# Patient Record
Sex: FEMALE | Race: WHITE | URBAN - METROPOLITAN AREA
[De-identification: names, ages, dates, MRNs, and addresses within clinical notes are randomized per-mention and may not be internally consistent; named-entity substitution may affect disease eponyms.]

---

## 2017-01-04 ENCOUNTER — PATIENT MESSAGE (OUTPATIENT)
Dept: FAMILY MEDICINE CLINIC | Age: 67
End: 2017-01-04

## 2017-02-17 ENCOUNTER — OFFICE VISIT (OUTPATIENT)
Dept: FAMILY MEDICINE CLINIC | Age: 67
End: 2017-02-17

## 2017-02-17 VITALS
HEART RATE: 76 BPM | BODY MASS INDEX: 20.38 KG/M2 | HEIGHT: 63 IN | SYSTOLIC BLOOD PRESSURE: 120 MMHG | DIASTOLIC BLOOD PRESSURE: 60 MMHG | WEIGHT: 115 LBS

## 2017-02-17 DIAGNOSIS — Z00.00 GENERAL MEDICAL EXAM: ICD-10-CM

## 2017-02-17 DIAGNOSIS — E78.00 ELEVATED CHOLESTEROL: Primary | Chronic | ICD-10-CM

## 2017-02-17 DIAGNOSIS — Z23 NEED FOR VACCINATION WITH 13-POLYVALENT PNEUMOCOCCAL CONJUGATE VACCINE: ICD-10-CM

## 2017-02-17 DIAGNOSIS — Z11.59 NEED FOR HEPATITIS C SCREENING TEST: ICD-10-CM

## 2017-02-17 DIAGNOSIS — M85.80 OSTEOPENIA: Chronic | ICD-10-CM

## 2017-02-17 LAB
A/G RATIO: 2.1 (ref 1.1–2.2)
ALBUMIN SERPL-MCNC: 4.6 G/DL (ref 3.4–5)
ALP BLD-CCNC: 81 U/L (ref 40–129)
ALT SERPL-CCNC: 21 U/L (ref 10–40)
ANION GAP SERPL CALCULATED.3IONS-SCNC: 14 MMOL/L (ref 3–16)
AST SERPL-CCNC: 27 U/L (ref 15–37)
BASOPHILS ABSOLUTE: 0 K/UL (ref 0–0.2)
BASOPHILS RELATIVE PERCENT: 0.4 %
BILIRUB SERPL-MCNC: 0.4 MG/DL (ref 0–1)
BILIRUBIN, POC: NORMAL
BLOOD URINE, POC: NORMAL
BUN BLDV-MCNC: 19 MG/DL (ref 7–20)
CALCIUM SERPL-MCNC: 9.7 MG/DL (ref 8.3–10.6)
CHLORIDE BLD-SCNC: 103 MMOL/L (ref 99–110)
CHOLESTEROL, TOTAL: 252 MG/DL (ref 0–199)
CLARITY, POC: NORMAL
CO2: 24 MMOL/L (ref 21–32)
COLOR, POC: NORMAL
CREAT SERPL-MCNC: 0.6 MG/DL (ref 0.6–1.2)
EOSINOPHILS ABSOLUTE: 0 K/UL (ref 0–0.6)
EOSINOPHILS RELATIVE PERCENT: 0.6 %
GFR AFRICAN AMERICAN: >60
GFR NON-AFRICAN AMERICAN: >60
GLOBULIN: 2.2 G/DL
GLUCOSE BLD-MCNC: 87 MG/DL (ref 70–99)
GLUCOSE URINE, POC: NORMAL
HCT VFR BLD CALC: 43.8 % (ref 36–48)
HDLC SERPL-MCNC: 110 MG/DL (ref 40–60)
HEMOGLOBIN: 14.4 G/DL (ref 12–16)
HEPATITIS C ANTIBODY INTERPRETATION: NORMAL
KETONES, POC: NORMAL
LDL CHOLESTEROL CALCULATED: 132 MG/DL
LEUKOCYTE EST, POC: NORMAL
LYMPHOCYTES ABSOLUTE: 1.6 K/UL (ref 1–5.1)
LYMPHOCYTES RELATIVE PERCENT: 30.9 %
MCH RBC QN AUTO: 29.5 PG (ref 26–34)
MCHC RBC AUTO-ENTMCNC: 32.8 G/DL (ref 31–36)
MCV RBC AUTO: 89.8 FL (ref 80–100)
MONOCYTES ABSOLUTE: 0.5 K/UL (ref 0–1.3)
MONOCYTES RELATIVE PERCENT: 8.8 %
NEUTROPHILS ABSOLUTE: 3.1 K/UL (ref 1.7–7.7)
NEUTROPHILS RELATIVE PERCENT: 59.3 %
NITRITE, POC: NORMAL
PDW BLD-RTO: 13.9 % (ref 12.4–15.4)
PH, POC: 6
PLATELET # BLD: 195 K/UL (ref 135–450)
PMV BLD AUTO: 10.7 FL (ref 5–10.5)
POTASSIUM SERPL-SCNC: 3.9 MMOL/L (ref 3.5–5.1)
PROTEIN, POC: NORMAL
RBC # BLD: 4.88 M/UL (ref 4–5.2)
SODIUM BLD-SCNC: 141 MMOL/L (ref 136–145)
SPECIFIC GRAVITY, POC: NORMAL
TOTAL PROTEIN: 6.8 G/DL (ref 6.4–8.2)
TRIGL SERPL-MCNC: 48 MG/DL (ref 0–150)
TSH SERPL DL<=0.05 MIU/L-ACNC: 2.27 UIU/ML (ref 0.27–4.2)
UROBILINOGEN, POC: 0.2
VITAMIN D 25-HYDROXY: 23 NG/ML
VLDLC SERPL CALC-MCNC: 10 MG/DL
WBC # BLD: 5.3 K/UL (ref 4–11)

## 2017-02-17 PROCEDURE — G8399 PT W/DXA RESULTS DOCUMENT: HCPCS | Performed by: FAMILY MEDICINE

## 2017-02-17 PROCEDURE — 90670 PCV13 VACCINE IM: CPT | Performed by: FAMILY MEDICINE

## 2017-02-17 PROCEDURE — 81002 URINALYSIS NONAUTO W/O SCOPE: CPT | Performed by: FAMILY MEDICINE

## 2017-02-17 PROCEDURE — 1123F ACP DISCUSS/DSCN MKR DOCD: CPT | Performed by: FAMILY MEDICINE

## 2017-02-17 PROCEDURE — G8427 DOCREV CUR MEDS BY ELIG CLIN: HCPCS | Performed by: FAMILY MEDICINE

## 2017-02-17 PROCEDURE — 1090F PRES/ABSN URINE INCON ASSESS: CPT | Performed by: FAMILY MEDICINE

## 2017-02-17 PROCEDURE — 4040F PNEUMOC VAC/ADMIN/RCVD: CPT | Performed by: FAMILY MEDICINE

## 2017-02-17 PROCEDURE — 3017F COLORECTAL CA SCREEN DOC REV: CPT | Performed by: FAMILY MEDICINE

## 2017-02-17 PROCEDURE — 36415 COLL VENOUS BLD VENIPUNCTURE: CPT | Performed by: FAMILY MEDICINE

## 2017-02-17 PROCEDURE — G8484 FLU IMMUNIZE NO ADMIN: HCPCS | Performed by: FAMILY MEDICINE

## 2017-02-17 PROCEDURE — 99214 OFFICE O/P EST MOD 30 MIN: CPT | Performed by: FAMILY MEDICINE

## 2017-02-17 PROCEDURE — G8419 CALC BMI OUT NRM PARAM NOF/U: HCPCS | Performed by: FAMILY MEDICINE

## 2017-02-17 PROCEDURE — 3014F SCREEN MAMMO DOC REV: CPT | Performed by: FAMILY MEDICINE

## 2017-02-17 PROCEDURE — G0009 ADMIN PNEUMOCOCCAL VACCINE: HCPCS | Performed by: FAMILY MEDICINE

## 2017-02-17 PROCEDURE — 1036F TOBACCO NON-USER: CPT | Performed by: FAMILY MEDICINE

## 2017-03-17 ENCOUNTER — NURSE ONLY (OUTPATIENT)
Dept: FAMILY MEDICINE CLINIC | Age: 67
End: 2017-03-17

## 2017-03-17 DIAGNOSIS — Z23 NEED FOR TDAP VACCINATION: Primary | ICD-10-CM

## 2017-03-17 PROCEDURE — 90715 TDAP VACCINE 7 YRS/> IM: CPT | Performed by: FAMILY MEDICINE

## 2017-03-17 PROCEDURE — 90471 IMMUNIZATION ADMIN: CPT | Performed by: FAMILY MEDICINE

## 2017-07-11 ENCOUNTER — HOSPITAL ENCOUNTER (OUTPATIENT)
Dept: MAMMOGRAPHY | Age: 67
Discharge: HOME | End: 2017-07-11
Attending: FAMILY MEDICINE | Admitting: FAMILY MEDICINE

## 2017-07-11 DIAGNOSIS — Z12.31 VISIT FOR SCREENING MAMMOGRAM: ICD-10-CM

## 2017-09-08 ENCOUNTER — HOSPITAL ENCOUNTER (OUTPATIENT)
Dept: MAMMOGRAPHY | Age: 67
Discharge: HOME | End: 2017-09-08
Attending: FAMILY MEDICINE | Admitting: FAMILY MEDICINE

## 2017-09-08 DIAGNOSIS — Z13.820 OSTEOPOROSIS SCREENING: ICD-10-CM

## 2017-09-08 DIAGNOSIS — M85.80 OTHER SPECIFIED DISORDERS OF BONE DENSITY AND STRUCTURE, UNSPECIFIED SITE: ICD-10-CM

## 2018-02-06 ENCOUNTER — OFFICE VISIT (OUTPATIENT)
Dept: FAMILY MEDICINE CLINIC | Age: 68
End: 2018-02-06

## 2018-02-06 VITALS
DIASTOLIC BLOOD PRESSURE: 58 MMHG | WEIGHT: 120.8 LBS | OXYGEN SATURATION: 99 % | HEIGHT: 63 IN | BODY MASS INDEX: 21.4 KG/M2 | SYSTOLIC BLOOD PRESSURE: 98 MMHG | HEART RATE: 65 BPM

## 2018-02-06 DIAGNOSIS — M79.2 RADICULAR PAIN IN RIGHT ARM: Primary | ICD-10-CM

## 2018-02-06 PROCEDURE — 1090F PRES/ABSN URINE INCON ASSESS: CPT | Performed by: FAMILY MEDICINE

## 2018-02-06 PROCEDURE — 4040F PNEUMOC VAC/ADMIN/RCVD: CPT | Performed by: FAMILY MEDICINE

## 2018-02-06 PROCEDURE — 3017F COLORECTAL CA SCREEN DOC REV: CPT | Performed by: FAMILY MEDICINE

## 2018-02-06 PROCEDURE — G8484 FLU IMMUNIZE NO ADMIN: HCPCS | Performed by: FAMILY MEDICINE

## 2018-02-06 PROCEDURE — 3014F SCREEN MAMMO DOC REV: CPT | Performed by: FAMILY MEDICINE

## 2018-02-06 PROCEDURE — 99213 OFFICE O/P EST LOW 20 MIN: CPT | Performed by: FAMILY MEDICINE

## 2018-02-06 PROCEDURE — G8399 PT W/DXA RESULTS DOCUMENT: HCPCS | Performed by: FAMILY MEDICINE

## 2018-02-06 PROCEDURE — 1123F ACP DISCUSS/DSCN MKR DOCD: CPT | Performed by: FAMILY MEDICINE

## 2018-02-06 PROCEDURE — G8427 DOCREV CUR MEDS BY ELIG CLIN: HCPCS | Performed by: FAMILY MEDICINE

## 2018-02-06 PROCEDURE — G8420 CALC BMI NORM PARAMETERS: HCPCS | Performed by: FAMILY MEDICINE

## 2018-02-06 PROCEDURE — 1036F TOBACCO NON-USER: CPT | Performed by: FAMILY MEDICINE

## 2018-02-06 NOTE — PROGRESS NOTES
the upper trapezius muscle. She does have good range of motion of the C-spine full range of motion of the shoulder or elbow and wrist. Motor strength is 5 over 5       Vitals:    02/06/18 1038   BP: (!) 98/58   Site: Right Arm   Position: Sitting   Cuff Size: Medium Adult   Pulse: 65   SpO2: 99%   Weight: 120 lb 12.8 oz (54.8 kg)   Height: 5' 2.5\" (1.588 m)       Assessment/Plan:   Eleazar Porter was seen today for shoulder pain and wrist pain. Diagnoses and all orders for this visit:    Radicular pain in right arm  -     [facility] PT - [facility]      Discussed with patient that her pain is most likely coming from her neck.  She will continue with ibuprofen and we will send her to physical therapy

## 2018-02-08 ENCOUNTER — HOSPITAL ENCOUNTER (OUTPATIENT)
Dept: PHYSICAL THERAPY | Age: 68
Discharge: HOME | End: 2018-02-28
Admitting: FAMILY MEDICINE

## 2018-02-08 NOTE — PLAN OF CARE
Shoulder ER T3 T3   Shoulder IR T9 T9 ant sh pain             Strength  Left Right   Shoulder Flex 4+ 4* pain anterior shoulder    Shoulder Scap 4+ 4* pain anterior shoulder   Shoulder ER 4+ 4* pain lateral shoulder    Shoulder IR 5 5   Elbow flexion 5 5* pain lateral shoulder   Elbow extension 5- 5-   Reflexes Left Right   C5-6 Biceps 2+ 2+   C5-6 Brachioradialis 2+ 2+   C7-8 Triceps 2+ 2+     Reflexes/Sensation:    [x]Dermatomes/Myotomes intact    []Reflexes equal and normal bilaterally   []Other:  Patellar reflexes 3+ and pendular  Achilles 2+ B  quadricep reflex 2+ B    Joint mobility: not assessed this visit, will need to assess GHJ and cervical spine NV   []Normal    []Hypo   []Hyper    Palpation: TTP all RC tendons on RUE as well as LH biceps    Functional Mobility/Transfers: indep with all     Posture: good seated posture    Bandages/Dressings/Incisions: NA    Gait: (include devices/WB status): WNL     Orthopedic Special Tests: - Estrella, -Clonus, - compression, + distraction, - spurling's, - alar, + speed's test, + Hawkin's test; - Neer, - empty can, O'harmony test is painful, but on posterior arm. Repeated motions: seated cervical retractions x10 arm pain alleviated, scap pain reduced from 6-7/10 to 5/10    Seated ret + ext x 10: pain reduced to 3/10 from 4-5/10 in scap: cervical ext ROM improved to 30 deg, but still with pain                   [x] Patient history, allergies, meds reviewed. Medical chart reviewed. See intake form. Review Of Systems (ROS):  [x]Performed Review of systems (Integumentary, CardioPulmonary, Neurological) by intake and observation. Intake form has been scanned into medical record. Patient has been instructed to contact their primary care physician regarding ROS issues if not already being addressed at this time.       Co-morbidities/Complexities (which will affect course of rehabilitation):   []None           Arthritic conditions   []Rheumatoid arthritis (M05.9)  [x]Osteoarthritis (M19.91) wrists and c-spine   Cardiovascular conditions   []Hypertension (I10)  []Hyperlipidemia (E78.5)  []Angina pectoris (I20)  []Atherosclerosis (I70)  []CVA Musculoskeletal conditions   []Disc pathology   []Congenital spine pathologies   []Prior surgical intervention  []Osteoporosis (M81.8)  [x]Osteopenia (M85.8)   Endocrine conditions   []Hypothyroid (E03.9)  []Hyperthyroid Gastrointestinal conditions   []Constipation (E46.56)   Metabolic conditions   []Morbid obesity (E66.01)  []Diabetes type 1(E10.65) or 2 (E11.65)   []Neuropathy (G60.9)     Pulmonary conditions   []Asthma (J45)  []Coughing   []COPD (J44.9)   Psychological Disorders  []Anxiety (F41.9)  []Depression (F32.9)   []Other:   []Other:          Barriers to/and or personal factors that will affect rehab potential:              []Age  []Sex   []Smoker              []Motivation/Lack of Motivation                        []Co-Morbidities              []Cognitive Function, education/learning barriers              []Environmental, home barriers              []profession/work barriers  [x]past PT/medical experience  []other:  Justification:     Falls Risk Assessment (30 days):   [x] Falls Risk assessed and no intervention required. [] Falls Risk assessed and Patient requires intervention due to being higher risk   TUG score (>12s at risk):     [] Falls education provided, including       G-Codes:  PT G-Codes  Functional Assessment Tool Used: Quick DASH  Score: 45  Functional Limitation: Carrying, moving and handling objects  Carrying, Moving and Handling Objects Current Status (): At least 40 percent but less than 60 percent impaired, limited or restricted  Carrying, Moving and Handling Objects Goal Status ():  At least 1 percent but less than 20 percent impaired, limited or restricted    ASSESSMENT:   Functional Impairments:     []Noted cervical/thoracic/GHJ joint hypomobility   []Noted cervical/thoracic/GHJ joint EVAL (MOD) 54784 (typically 30 minutes face-to-face)  [] EVAL (HIGH) 37135 (typically 45 minutes face-to-face)  [] RE-EVAL     PLAN:   Frequency/Duration:  2 days per week for 6-8 Weeks:  Interventions:  [x]  Therapeutic exercise including: strength training, ROM, for cervical spine,scapula, core and Upper extremity, including postural re-education. [x]  NMR activation and proprioception for Deep cervical flexors, periscapular and RC muscles and Core, including postural re-education. [x]  Manual therapy as indicated for C/T spine, ribs, Soft tissue to include: Dry Needling/IASTM, STM, PROM, Gr I-III mobilizations, manipulation. [x] Modalities as needed that may include: thermal agents, E-stim, Biofeedback, US, iontophoresis as indicated  [x] Patient education on joint protection, postural re-education, activity modification, progression of HEP. HEP instruction:(see scanned forms)    GOALS:  Patient stated goal: Pt would like to get back to work; get back to normal exercise; and learn PT exercises. Therapist goals for Patient:   Short Term Goals: To be achieved in: 2 weeks  1. Independent in HEP and progression per patient tolerance, in order to prevent re-injury. 2. Patient will have a decrease in pain to facilitate improvement in movement, function, and ADLs as indicated by Functional Deficits. Long Term Goals: To be achieved in: 6-8 weeks  1. Disability index score of 16% or less for the Quick DASH to assist with reaching prior level of function. 2. Patient will demonstrate increased [facility] to [facility] and without pain of cervical/thoracic spine to allow for proper joint functioning for normal driving, looking overhead. 3. Patient will demonstrate an increase in postural awareness and control and activation of  Deep cervical stabilizers to allow for proper functional mobility as indicated by patients Functional Deficits.    4. Patient will have improved right shoulder ROM to [facility] without pain for all reaching, lifting ADL's and in order to return to her part time job. 5. Pt will have improved B shoulder strength to at least 5-/5 and scap strength to 4/5 in order to stabilize her shoulders and spine for ADL's of lifting and reaching as well as for part time job duties. 6. Pt will be able to resume working at her part time job lifting up to 20# without increased sx. 7. Pt will be able to return to an indep UE exercise/gym program or progress to [facility] as appropriate.       Electronically signed by:  Jomar Oliver, PT, DPT

## 2018-02-08 NOTE — FLOWSHEET NOTE
proprioception of scapular, scapulothoracic and UE control with self care, reaching, carrying, lifting, house/yardwork, driving/computer work      Manual Treatments:  PROM / STM / Oscillations-Mobs:  G-I, II, III, IV (PA's, Inf., Post.)  [x] (03333) Provided manual therapy to mobilize soft tissue/joints of cervical/CT, scapular GHJ and UE for the purpose of modulating pain, promoting relaxation,  increasing ROM, reducing/eliminating soft tissue swelling/inflammation/restriction, improving soft tissue extensibility and allowing for proper ROM for normal function with self care, reaching, carrying, lifting, house/yardwork, driving/computer work    Modalities:  IFC + MHP scap (recommend CP for ant shoulder at home) x 15'     Charges:  Timed Code Treatment Minutes: 30   Total Treatment Minutes: 75 (eval, IFC0     [] EVAL (LOW) 73483 (typically 20 minutes face-to-face)  [x] EVAL (MOD) 74229 (typically 30 minutes face-to-face)  [] EVAL (HIGH) 87753 (typically 45 minutes face-to-face)  [] RE-EVAL     [x] YJ(37317) x  2   [] IONTO  [] NMR (76206) x      [] VASO  [] Manual (12732) x       [] Other:  [] TA x       [] Mech Traction (39248)  [] ES(attended) (09484)      [x] ES (un) (84579):     GOALS: Patient stated goal: Pt would like to get back to work; get back to normal exercise; and learn PT exercises.     Therapist goals for Patient:   Short Term Goals: To be achieved in: 2 weeks  1. Independent in HEP and progression per patient tolerance, in order to prevent re-injury. 2. Patient will have a decrease in pain to facilitate improvement in movement, function, and ADLs as indicated by Functional Deficits.     Long Term Goals: To be achieved in: 6-8 weeks  1. Disability index score of 16% or less for the Quick DASH to assist with reaching prior level of function.    2. Patient will demonstrate increased AROM to [facility] and without pain of cervical/thoracic spine to allow for proper joint functioning for normal driving, looking

## 2018-02-12 ENCOUNTER — HOSPITAL ENCOUNTER (OUTPATIENT)
Dept: PHYSICAL THERAPY | Age: 68
Discharge: HOME | End: 2018-02-13
Admitting: FAMILY MEDICINE

## 2018-02-12 NOTE — FLOWSHEET NOTE
Pt would like to get back to work; get back to normal exercise; and learn PT exercises.     Therapist goals for Patient:   Short Term Goals: To be achieved in: 2 weeks  1. Independent in HEP and progression per patient tolerance, in order to prevent re-injury. 2. Patient will have a decrease in pain to facilitate improvement in movement, function, and ADLs as indicated by Functional Deficits.     Long Term Goals: To be achieved in: 6-8 weeks  1. Disability index score of 16% or less for the Quick DASH to assist with reaching prior level of function. 2. Patient will demonstrate increased AROM to [facility] and without pain of cervical/thoracic spine to allow for proper joint functioning for normal driving, looking overhead. 3. Patient will demonstrate an increase in postural awareness and control and activation of  Deep cervical stabilizers to allow for proper functional mobility as indicated by patients Functional Deficits. 4. Patient will have improved right shoulder ROM to [facility] without pain for all reaching, lifting ADL's and in order to return to her part time job. 5. Pt will have improved B shoulder strength to at least 5-/5 and scap strength to 4/5 in order to stabilize her shoulders and spine for ADL's of lifting and reaching as well as for part time job duties. 6. Pt will be able to resume working at her part time job lifting up to 20# without increased sx. 7. Pt will be able to return to an indep UE exercise/gym program or progress to [facility] as appropriate. Progression Towards Functional goals:  [] Patient is progressing as expected towards functional goals listed. [] Progression is slowed due to complexities listed. [] Progression has been slowed due to co-morbidities. [x] Plan just implemented, too soon to assess goals progression  [] Other:     ASSESSMENT:  Pt is responding well to cervical repeated motions.  However, she forgot to complete extensions and thus reviewed these with pt and provided a copy of this page from her HEP. Completing the sequence of ret + ext abolishes her scap and sh pain at rest. However, pain at end range ext still rated 3/10. She also has 3/10 pain when she initiates motion with the RUE. Feel that pain with AROM of the shoulder is more related to RC tendonitis vs. Cervical sx.      Treatment/Activity Tolerance:  [x] Patient tolerated treatment well [] Patient limited by fatique  [] Patient limited by pain  [] Patient limited by other medical complications  [] Other:     Prognosis: [x] Good [] Fair  [] Poor    Patient Requires Follow-up: [x] Yes  [] No    PLAN: Pt to complete ret + ext ~6x/day; add light scap strength and shoulder AAROm to HEP NV   [x] Continue per plan of care [] Alter current plan (see comments)  [] Plan of care initiated [] Hold pending MD visit [] Discharge    Electronically signed by: Herlinda Saini, PT, DPT

## 2018-02-15 ENCOUNTER — HOSPITAL ENCOUNTER (OUTPATIENT)
Dept: PHYSICAL THERAPY | Age: 68
Discharge: HOME | End: 2018-02-16
Admitting: FAMILY MEDICINE

## 2018-02-15 NOTE — FLOWSHEET NOTE
[facility],  [address]  Phone: [phone]  Fax [phone]      Physical Therapy Daily Treatment Note  Date:  2/15/2018    Patient Name:  Bhupendra Abarca    :  1950  MRN: [id number]  Restrictions/Precautions:    Medical/Treatment Diagnosis Information:  · Diagnosis: M79.2 (ICD-10-CM) - Radicular pain in right arm  · Treatment Diagnosis: M25.511 Pain in right shoulder; M79.621 Pain in right upper arm; M54.2 Cervicalgia; M54.12 Cervical radiculopathy; M25.60 stiffness unspecified; M75.41 Right shoulder impingement  Insurance/Certification information:  PT Insurance Information: [employer]  Physician Information:  Referring Practitioner: Kiran Mueller MD  Plan of care signed (Y/N):     Date of Patient follow up with Physician:     G-Code (if applicable):      Date G-Code Applied:  18  PT G-Codes  Functional Assessment Tool Used: Quick DASH  Score: 45  Functional Limitation: Carrying, moving and handling objects  Carrying, Moving and Handling Objects Current Status (): At least 40 percent but less than 60 percent impaired, limited or restricted  Carrying, Moving and Handling Objects Goal Status (): At least 1 percent but less than 20 percent impaired, limited or restricted    Progress Note: [x]  Yes  []  No  Next due by: Visit #10      Latex Allergy:  [x]NO      []YES  Preferred Language for Healthcare:   [x]English       []other:    Visit # Insurance Allowable Requires auth   3 BMN    []no        []yes:     Pain level:  0/10 in scap, 3/10 in arm at worst     SUBJECTIVE: Pt states that she is doing better, she is not having pain in the shoulder blade, but still having pain in the anterior shoulder, especially when she moves her arm. She is still having pain with full cervical extension, but this does not linger after her exercises. Reports that she worked yesterday and it went.    OBJECTIVE: Observation:   Test measurements:  Full cerv flex, ext ROM 35 deg with 3/10 pain; full SB, rot ROM    RESTRICTIONS/PRECAUTIONS: osteopenia    Exercises/Interventions:   Therapeutic Ex Sets/rep comments   Pt education: findings of evaluation: her pain likely is stemming from both cervical spine as well as from her shoulder; using rep motions to improve pain, centralization, prec's to stop rep motions if pheripheralization occurs; postural education and roll in pain; bed positioning for neck and shoulder comfort; avoid exercise and rep shoulder motions that cause increased pain at this time; ice for ant shoulder pain      Cervical retraction seated x10 Pain abolished in scap, arm   cerv retraction supine  Pain reduced in scap and arm   cerv retraction + ext x10 Pain abolished in scap and arm   TT row, pull down 2x10  + HEP yellow   Pulleys flex, scaption 10x ea    Shoulder isometrics flex, ABD, ext, ER, IR 10\" x10 ea + HEP                                                               Manual Intervention     STM RC tendons 8'    GHJ distraction, oscillation (better with long axis d/t getting some wrist distraction as well) GIII 4x30\"    GHJ post mobilization in IR GIII 3x30\"    scap mobs All                    NMR re-education                                                 Therapeutic Exercise and NMR EXR  [x] (66331) Provided verbal/tactile cueing for activities related to strengthening, flexibility, endurance, ROM  for improvements in scapular, scapulothoracic and UE control with self care, reaching, carrying, lifting, house/yardwork, driving/computer work.    [] (59374) Provided verbal/tactile cueing for activities related to improving balance, coordination, kinesthetic sense, posture, motor skill, proprioception  to assist with  scapular, scapulothoracic and UE control with self care, reaching, carrying, lifting, house/yardwork, driving/computer work.     Therapeutic Activities:    [] (12912 or 96294) Provided verbal/tactile cueing for activities related to improving balance, coordination, kinesthetic sense, posture, motor skill, proprioception and motor activation to allow for proper function of scapular, scapulothoracic and UE control with self care, carrying, lifting, driving/computer work.      Home Exercise Program:    [x] (58718) Reviewed/Progressed HEP activities related to strengthening, flexibility, endurance, ROM of scapular, scapulothoracic and UE control with self care, reaching, carrying, lifting, house/yardwork, driving/computer work  [] (71017) Reviewed/Progressed HEP activities related to improving balance, coordination, kinesthetic sense, posture, motor skill, proprioception of scapular, scapulothoracic and UE control with self care, reaching, carrying, lifting, house/yardwork, driving/computer work      Manual Treatments:  PROM / STM / Oscillations-Mobs:  G-I, II, III, IV (PA's, Inf., Post.)  [x] (82355) Provided manual therapy to mobilize soft tissue/joints of cervical/CT, scapular GHJ and UE for the purpose of modulating pain, promoting relaxation,  increasing ROM, reducing/eliminating soft tissue swelling/inflammation/restriction, improving soft tissue extensibility and allowing for proper ROM for normal function with self care, reaching, carrying, lifting, house/yardwork, driving/computer work    Modalities:  IFC + CP scap (recommend CP for ant shoulder at home) x 15'     Charges:  Timed Code Treatment Minutes: 35   Total Treatment Minutes: 50 ( IFC)     [] EVAL (LOW) 68083 (typically 20 minutes face-to-face)  [] EVAL (MOD) 80078 (typically 30 minutes face-to-face)  [] EVAL (HIGH) 02262 (typically 45 minutes face-to-face)  [] RE-EVAL     [x] ZP(05172) x  1   [] IONTO  [] NMR (46944) x      [] VASO  [x] Manual (32563) x  1    [] Other:  [] TA x       [] Mech Traction (20397)  [] ES(attended) (88763)      [x] ES (un) (26342):     GOALS: Patient stated goal: Pt would like to get back to work; get back to normal exercise; and learn PT exercises.     Therapist goals for Patient:   Short Term Goals: To be achieved in: 2 weeks  1. Independent in HEP and progression per patient tolerance, in order to prevent re-injury. 2. Patient will have a decrease in pain to facilitate improvement in movement, function, and ADLs as indicated by Functional Deficits.     Long Term Goals: To be achieved in: 6-8 weeks  1. Disability index score of 16% or less for the Quick DASH to assist with reaching prior level of function. 2. Patient will demonstrate increased AROM to [facility] and without pain of cervical/thoracic spine to allow for proper joint functioning for normal driving, looking overhead. 3. Patient will demonstrate an increase in postural awareness and control and activation of  Deep cervical stabilizers to allow for proper functional mobility as indicated by patients Functional Deficits. 4. Patient will have improved right shoulder ROM to [facility] without pain for all reaching, lifting ADL's and in order to return to her part time job. 5. Pt will have improved B shoulder strength to at least 5-/5 and scap strength to 4/5 in order to stabilize her shoulders and spine for ADL's of lifting and reaching as well as for part time job duties. 6. Pt will be able to resume working at her part time job lifting up to 20# without increased sx. 7. Pt will be able to return to an indep UE exercise/gym program or progress to [facility] as appropriate. Progression Towards Functional goals:  [] Patient is progressing as expected towards functional goals listed. [] Progression is slowed due to complexities listed. [] Progression has been slowed due to co-morbidities. [x] Plan just implemented, too soon to assess goals progression  [] Other:     ASSESSMENT:  Pt continues to respond well to repeated cervical motions with less scapular pain overall. Therefore, focused more time on her shoulder today.  Added in shoulder isometrics without

## 2018-02-19 ENCOUNTER — HOSPITAL ENCOUNTER (OUTPATIENT)
Dept: PHYSICAL THERAPY | Age: 68
Discharge: HOME | End: 2018-02-20
Admitting: FAMILY MEDICINE

## 2018-02-22 ENCOUNTER — HOSPITAL ENCOUNTER (OUTPATIENT)
Dept: PHYSICAL THERAPY | Age: 68
Discharge: HOME | End: 2018-02-23
Admitting: FAMILY MEDICINE

## 2018-02-22 ENCOUNTER — OFFICE VISIT (OUTPATIENT)
Dept: FAMILY MEDICINE CLINIC | Age: 68
End: 2018-02-22

## 2018-02-22 ENCOUNTER — TELEPHONE (OUTPATIENT)
Dept: FAMILY MEDICINE CLINIC | Age: 68
End: 2018-02-22

## 2018-02-22 VITALS
OXYGEN SATURATION: 98 % | SYSTOLIC BLOOD PRESSURE: 100 MMHG | WEIGHT: 121 LBS | DIASTOLIC BLOOD PRESSURE: 60 MMHG | HEIGHT: 63 IN | HEART RATE: 59 BPM | BODY MASS INDEX: 21.44 KG/M2

## 2018-02-22 DIAGNOSIS — Z00.00 ROUTINE GENERAL MEDICAL EXAMINATION AT A HEALTH CARE FACILITY: Primary | ICD-10-CM

## 2018-02-22 DIAGNOSIS — Z23 NEED FOR PROPHYLACTIC VACCINATION AGAINST STREPTOCOCCUS PNEUMONIAE (PNEUMOCOCCUS): ICD-10-CM

## 2018-02-22 PROCEDURE — 90732 PPSV23 VACC 2 YRS+ SUBQ/IM: CPT | Performed by: FAMILY MEDICINE

## 2018-02-22 PROCEDURE — G0009 ADMIN PNEUMOCOCCAL VACCINE: HCPCS | Performed by: FAMILY MEDICINE

## 2018-02-22 PROCEDURE — G0438 PPPS, INITIAL VISIT: HCPCS | Performed by: FAMILY MEDICINE

## 2018-02-22 ASSESSMENT — LIFESTYLE VARIABLES
HOW OFTEN DURING THE LAST YEAR HAVE YOU HAD A FEELING OF GUILT OR REMORSE AFTER DRINKING: 0
HAVE YOU OR SOMEONE ELSE BEEN INJURED AS A RESULT OF YOUR DRINKING: 0
HOW OFTEN DURING THE LAST YEAR HAVE YOU NEEDED AN ALCOHOLIC DRINK FIRST THING IN THE MORNING TO GET YOURSELF GOING AFTER A NIGHT OF HEAVY DRINKING: 0
HOW OFTEN DURING THE LAST YEAR HAVE YOU FOUND THAT YOU WERE NOT ABLE TO STOP DRINKING ONCE YOU HAD STARTED: 0
AUDIT TOTAL SCORE: 2
HOW OFTEN DO YOU HAVE A DRINK CONTAINING ALCOHOL: 2
HOW OFTEN DO YOU HAVE SIX OR MORE DRINKS ON ONE OCCASION: 0
HOW OFTEN DURING THE LAST YEAR HAVE YOU BEEN UNABLE TO REMEMBER WHAT HAPPENED THE NIGHT BEFORE BECAUSE YOU HAD BEEN DRINKING: 0
HOW OFTEN DURING THE LAST YEAR HAVE YOU FAILED TO DO WHAT WAS NORMALLY EXPECTED FROM YOU BECAUSE OF DRINKING: 0
AUDIT-C TOTAL SCORE: 2
HOW MANY STANDARD DRINKS CONTAINING ALCOHOL DO YOU HAVE ON A TYPICAL DAY: 0
HAS A RELATIVE, FRIEND, DOCTOR, OR ANOTHER HEALTH PROFESSIONAL EXPRESSED CONCERN ABOUT YOUR DRINKING OR SUGGESTED YOU CUT DOWN: 0

## 2018-02-22 ASSESSMENT — PATIENT HEALTH QUESTIONNAIRE - PHQ9: SUM OF ALL RESPONSES TO PHQ QUESTIONS 1-9: 0

## 2018-02-22 ASSESSMENT — ANXIETY QUESTIONNAIRES: GAD7 TOTAL SCORE: 2

## 2018-02-22 NOTE — PATIENT INSTRUCTIONS
Personalized Preventive Plan for William Starr - 2/22/2018  Medicare offers a range of preventive health benefits. Some of the tests and screenings are paid in full while other may be subject to a deductible, co-insurance, and/or copay. Some of these benefits include a comprehensive review of your medical history including lifestyle, illnesses that may run in your family, and various assessments and screenings as appropriate. After reviewing your medical record and screening and assessments performed today your provider may have ordered immunizations, labs, imaging, and/or referrals for you. A list of these orders (if applicable) as well as your Preventive Care list are included within your After Visit Summary for your review. Other Preventive Recommendations:    · A preventive eye exam performed by an eye specialist is recommended every 1-2 years to screen for glaucoma; cataracts, macular degeneration, and other eye disorders. · A preventive dental visit is recommended every 6 months. · Try to get at least 150 minutes of exercise per week or 10,000 steps per day on a pedometer . · Order or download the FREE \"Exercise & Physical Activity: Your Everyday Guide\" from [employer]. Call 4-625.909.5996 or search [employer] online. · You need 0363-6054 mg of calcium and 5305-8120 IU of vitamin D per day. It is possible to meet your calcium requirement with diet alone, but a vitamin D supplement is usually necessary to meet this goal.  · When exposed to the sun, use a sunscreen that protects against both UVA and UVB radiation with an SPF of 30 or greater. Reapply every 2 to 3 hours or after sweating, drying off with a towel, or swimming. · Always wear a seat belt when traveling in a car. Always wear a helmet when riding a bicycle or motorcycle.

## 2018-02-22 NOTE — TELEPHONE ENCOUNTER
Patient had an appointment today and forgot to mention she will be going to [facility] in September. She has been on the [facility] website and it mentions Typhoid and HEP A. She would like to know if there are any others you think she should get and when?

## 2018-02-22 NOTE — PROGRESS NOTES
Medicare Annual Wellness Visit  Name: Anupam Francisco Date: 2018   MRN: [id number] Sex: Female   Age: 75 y.o. Ethnicity: Non-/Non    : 1950 Race: Radha Pfeiffer is here for Medicare AWV (Pt is here [facility] Annual Wellness Visit. currently in physical therapy for shoulder)    Screenings for behavioral, psychosocial and functional/safety risks, and cognitive dysfunction are all negative except as indicated below. These results, as well as other patient data from the [address] form, are documented in Flowsheets linked to this Encounter. Allergies   Allergen Reactions    Gluten Meal      Dairy and gluten intolerant     Prior to Visit Medications    Medication Sig Taking?  Authorizing Provider   CALCIUM-VITAMIN D PO Take by mouth daily Yes Historical Provider, MD   Estradiol (VAGIFEM) 10 MCG TABS vaginal tablet Place 10 mcg vaginally Twice a Week Yes Historical Provider, MD   budesonide-formoterol (SYMBICORT) 160-4.5 MCG/ACT AERO Inhale 2 puffs into the lungs 2 times daily Yes Historical Provider, MD   levalbuterol (XOPENEX HFA) 45 MCG/ACT inhaler Inhale 1-2 puffs into the lungs every 4 hours as needed for Wheezing Yes Historical Provider, MD   Multiple Minerals-Vitamins (BONE DENSITY BUILDER) TABS Take by mouth Yes Historical Provider, MD     Past Medical History:   Diagnosis Date    Allergic rhinitis     desensitization    Asthma     Cysts, kidneys     CT     Irritable bowel syndrome     Liver cyst     CT     Macular degeneration      Past Surgical History:   Procedure Laterality Date    APPENDECTOMY  age 31   [facility]      COLONOSCOPY      Dr. Umu Phillips, tubular adenoma transverse colon    COLONOSCOPY  6/10/16    tics    HERNIA REPAIR      UPPER GASTROINTESTINAL ENDOSCOPY  6/10/16    [facility]    WRIST GANGLION EXCISION  age 14     Family History   Problem Relation Age of Onset    Cancer Mother      ovarian   

## 2018-02-22 NOTE — FLOWSHEET NOTE
noted in B UT's      RESTRICTIONS/PRECAUTIONS: osteopenia    Exercises/Interventions:   Therapeutic Ex Sets/rep comments   Pt education: makayla lumbar roll 3'    Cervical retraction seated Pain abolished in scap, arm   cerv retraction supine Pain reduced in scap and arm   cerv retraction + ext Pain abolished in scap and arm   TT row, pull down 2x10  + HEP yellow   Pulleys flex, scaption 10x ea    Shoulder isometrics flex, ABD, ext, ER, IR  + HEP   SA punches 2x10 Soreness afterward   S/l ER 3x10    Prone row, sh ext 2x10 ea + HEP   Flexion AAROM with cane 10x + HEP   Bench press with cane 2x10 1.5#   ER wall walks 2x5' yellow                                 Manual Intervention     STM RC tendons, B UT's 10'    GHJ distraction, oscillation (better with long axis d/t getting some wrist distraction as well) GIII 4x30\"    GHJ post mobilization in IR GIII 3x30\"    scap mobs                    NMR re-education                                                 Therapeutic Exercise and NMR EXR  [x] (75912) Provided verbal/tactile cueing for activities related to strengthening, flexibility, endurance, ROM  for improvements in scapular, scapulothoracic and UE control with self care, reaching, carrying, lifting, house/yardwork, driving/computer work.    [] (11941) Provided verbal/tactile cueing for activities related to improving balance, coordination, kinesthetic sense, posture, motor skill, proprioception  to assist with  scapular, scapulothoracic and UE control with self care, reaching, carrying, lifting, house/yardwork, driving/computer work. Therapeutic Activities:    [] (72686 or 86314) Provided verbal/tactile cueing for activities related to improving balance, coordination, kinesthetic sense, posture, motor skill, proprioception and motor activation to allow for proper function of scapular, scapulothoracic and UE control with self care, carrying, lifting, driving/computer work.      Home Exercise Program:    [x] (70102) Reviewed/Progressed HEP activities related to strengthening, flexibility, endurance, ROM of scapular, scapulothoracic and UE control with self care, reaching, carrying, lifting, house/yardwork, driving/computer work  [] (30155) Reviewed/Progressed HEP activities related to improving balance, coordination, kinesthetic sense, posture, motor skill, proprioception of scapular, scapulothoracic and UE control with self care, reaching, carrying, lifting, house/yardwork, driving/computer work      Manual Treatments:  PROM / STM / Oscillations-Mobs:  G-I, II, III, IV (PA's, Inf., Post.)  [x] (27864) Provided manual therapy to mobilize soft tissue/joints of cervical/CT, scapular GHJ and UE for the purpose of modulating pain, promoting relaxation,  increasing ROM, reducing/eliminating soft tissue swelling/inflammation/restriction, improving soft tissue extensibility and allowing for proper ROM for normal function with self care, reaching, carrying, lifting, house/yardwork, driving/computer work    Modalities:  IFC + CP scap (recommend CP for ant shoulder at home) x 15'     Charges:  Timed Code Treatment Minutes: 40   Total Treatment Minutes: 55 ( IFC)     [] EVAL (LOW) 75293 (typically 20 minutes face-to-face)  [] EVAL (MOD) 93830 (typically 30 minutes face-to-face)  [] EVAL (HIGH) 65142 (typically 45 minutes face-to-face)  [] RE-EVAL     [x] AR(13473) x  2   [] IONTO  [] NMR (69947) x      [] VASO  [x] Manual (40227) x  1    [] Other:  [] TA x       [] Mech Traction (57436)  [] ES(attended) (00805)      [x] ES (un) (75269):     GOALS: Patient stated goal: Pt would like to get back to work; get back to normal exercise; and learn PT exercises.     Therapist goals for Patient:   Short Term Goals: To be achieved in: 2 weeks  1. Independent in HEP and progression per patient tolerance, in order to prevent re-injury.    2. Patient will have a decrease in pain to facilitate improvement in movement, function, and ADLs as indicated by Functional Deficits.     Long Term Goals: To be achieved in: 6-8 weeks  1. Disability index score of 16% or less for the Quick DASH to assist with reaching prior level of function. 2. Patient will demonstrate increased AROM to [facility] and without pain of cervical/thoracic spine to allow for proper joint functioning for normal driving, looking overhead. 3. Patient will demonstrate an increase in postural awareness and control and activation of  Deep cervical stabilizers to allow for proper functional mobility as indicated by patients Functional Deficits. 4. Patient will have improved right shoulder ROM to [facility] without pain for all reaching, lifting ADL's and in order to return to her part time job. 5. Pt will have improved B shoulder strength to at least 5-/5 and scap strength to 4/5 in order to stabilize her shoulders and spine for ADL's of lifting and reaching as well as for part time job duties. 6. Pt will be able to resume working at her part time job lifting up to 20# without increased sx. 7. Pt will be [facility] or progress to [facility] as appropriate. Progression Towards Functional goals:  [x] Patient is progressing as expected towards functional goals listed. [] Progression is slowed due to complexities listed. [] Progression has been slowed due to co-morbidities. [] Plan just implemented, too soon to assess goals progression  [] Other:     ASSESSMENT:  Pt has improved radicular sx and have only revisited rep motions for form check. Have focused more on building scap strength, for which pt has good tolerance. She will benefit from continued PT to further improve scap and R shoulder strength, restore full shoulder ROM so that she may return to full work duties.       Treatment/Activity Tolerance:  [x] Patient tolerated treatment well [] Patient limited by fatique  [] Patient limited by pain  [] Patient limited by other medical complications  []

## 2018-02-26 ENCOUNTER — HOSPITAL ENCOUNTER (OUTPATIENT)
Dept: PHYSICAL THERAPY | Age: 68
Discharge: HOME | End: 2018-02-27
Admitting: FAMILY MEDICINE

## 2018-03-01 ENCOUNTER — HOSPITAL ENCOUNTER (OUTPATIENT)
Dept: PHYSICAL THERAPY | Age: 68
Discharge: HOME | End: 2018-03-02
Admitting: FAMILY MEDICINE

## 2018-03-01 ENCOUNTER — HOSPITAL ENCOUNTER (OUTPATIENT)
Dept: PHYSICAL THERAPY | Age: 68
Discharge: HOME | End: 2018-03-31
Attending: FAMILY MEDICINE | Admitting: FAMILY MEDICINE

## 2018-03-01 NOTE — FLOWSHEET NOTE
[place] [facility],  [address] [place]  Phone: [phone]  Fax [phone]      Physical Therapy Daily Treatment Note  Date:  3/1/2018    Patient Name:  Umesh Roca    :  1950  MRN: [id number]  Restrictions/Precautions:    Medical/Treatment Diagnosis Information:  · Diagnosis: M79.2 (ICD-10-CM) - Radicular pain in right arm  · Treatment Diagnosis: M25.511 Pain in right shoulder; M79.621 Pain in right upper arm; M54.2 Cervicalgia; M54.12 Cervical radiculopathy; M25.60 stiffness unspecified; M75.41 Right shoulder impingement  Insurance/Certification information:  PT Insurance Information: /Med mutual  Physician Information:  Referring Practitioner: Elana Delgadillo MD  Plan of care signed (Y/N):     Date of Patient follow up with Physician:     G-Code (if applicable):      Date G-Code Applied:  18  PT G-Codes  Functional Assessment Tool Used: Quick DASH  Score: 45  Functional Limitation: Carrying, moving and handling objects  Carrying, Moving and Handling Objects Current Status (): At least 40 percent but less than 60 percent impaired, limited or restricted  Carrying, Moving and Handling Objects Goal Status (): At least 1 percent but less than 20 percent impaired, limited or restricted    Progress Note: [x]  Yes  []  No  Next due by: Visit #10      Latex Allergy:  [x]NO      []YES  Preferred Language for Healthcare:   [x]English       []other:    Visit # Insurance Allowable Requires auth   7 BMN    []no        []yes:     Pain level:  0/10 in scap, 2/10 in arm at worst     SUBJECTIVE: Pt states that she continues to get better overall. She did  work yesterday and felt a bit better doing this than she did last week. She is feeling stronger overall although her right arm hurts at times still.    OBJECTIVE:   Observation: muscular tension noted in B UT's      RESTRICTIONS/PRECAUTIONS: to facilitate improvement in movement, function, and ADLs as indicated by Functional Deficits.     Long Term Goals: To be achieved in: 6-8 weeks  1. Disability index score of 16% or less for the Quick DASH to assist with reaching prior level of function. 2. Patient will demonstrate increased AROM to [facility] and without pain of cervical/thoracic spine to allow for proper joint functioning for normal driving, looking overhead. 3. Patient will demonstrate an increase in postural awareness and control and activation of  Deep cervical stabilizers to allow for proper functional mobility as indicated by patients Functional Deficits. 4. Patient will have improved right shoulder ROM to [facility] without pain for all reaching, lifting ADL's and in order to return to her part time job. 5. Pt will have improved B shoulder strength to at least 5-/5 and scap strength to 4/5 in order to stabilize her shoulders and spine for ADL's of lifting and reaching as well as for part time job duties. 6. Pt will be able to resume working at her part time job lifting up to 20# without increased sx. 7. Pt will be able to return to an [facility] UE exercise/gym program or progress to [facility] as appropriate. Progression Towards Functional goals:  [x] Patient is progressing as expected towards functional goals listed. [] Progression is slowed due to complexities listed. [] Progression has been slowed due to co-morbidities. [] Plan just implemented, too soon to assess goals progression  [] Other:     ASSESSMENT:  Pt's radicular sx appear to be resolved. However, she still has pain with lifting and OH activities, so have continued to work on improving shoulder ROM, scap and RC stability. She tolerates this well, with minimal pain at the end of her session. She will benefit from continued PT to further improve scap and R shoulder strength, restore full shoulder ROM so that she may return to full work duties.       Treatment/Activity

## 2018-03-05 ENCOUNTER — HOSPITAL ENCOUNTER (OUTPATIENT)
Dept: PHYSICAL THERAPY | Age: 68
Discharge: HOME | End: 2018-03-06
Admitting: FAMILY MEDICINE

## 2018-03-05 NOTE — FLOWSHEET NOTE
[place] [facility], [address]  Phone: [phone]  Fax [phone]      Physical Therapy Daily Treatment Note  Date:  3/5/2018    Patient Name:  Satnam Mcginnis    :  1950  MRN: [id number]  Restrictions/Precautions:    Medical/Treatment Diagnosis Information:  · Diagnosis: M79.2 (ICD-10-CM) - Radicular pain in right arm  · Treatment Diagnosis: M25.511 Pain in right shoulder; M79.621 Pain in right upper arm; M54.2 Cervicalgia; M54.12 Cervical radiculopathy; M25.60 stiffness unspecified; M75.41 Right shoulder impingement  Insurance/Certification information:  PT Insurance Information: /Med mutual  Physician Information:  Referring Practitioner: Greg Sosa MD  Plan of care signed (Y/N):     Date of Patient follow up with Physician:     G-Code (if applicable):      Date G-Code Applied:  18  PT G-Codes  Functional Assessment Tool Used: Quick DASH  Score: 45  Functional Limitation: Carrying, moving and handling objects  Carrying, Moving and Handling Objects Current Status (): At least 40 percent but less than 60 percent impaired, limited or restricted  Carrying, Moving and Handling Objects Goal Status (): At least 1 percent but less than 20 percent impaired, limited or restricted    Progress Note: [x]  Yes  []  No  Next due by: Visit #10      Latex Allergy:  [x]NO      []YES  Preferred Language for Healthcare:   [x]English       []other:    Visit # Insurance Allowable Requires auth   8 BMN    []no        []yes:     Pain level:  0/10 in scap, 2/10 in arm at worst     SUBJECTIVE: Pt  Reports that her shoulder is getting much better. She is feeling stronger. She does still have pain with quick motions at times. OBJECTIVE:   Observation: muscular tension noted in B UT's  Full cerv flex, ext ROM 35 deg with 3/10 pain; full SB, rot ROM  Shoulder flexion AROM 175, ABD AROM 175 with 1.5/10 pain.    MMT flex and progression per patient tolerance, in order to prevent re-injury. 2. Patient will have a decrease in pain to facilitate improvement in movement, function, and ADLs as indicated by Functional Deficits.     Long Term Goals: To be achieved in: 6-8 weeks  1. Disability index score of 16% or less for the Quick DASH to assist with reaching prior level of function. 2. Patient will demonstrate increased AROM to [facility] and without pain of cervical/thoracic spine to allow for proper joint functioning for normal driving, looking overhead. 3. Patient will demonstrate an increase in postural awareness and control and activation of  Deep cervical stabilizers to allow for proper functional mobility as indicated by patients Functional Deficits. 4. Patient will have improved right shoulder ROM to [facility] without pain for all reaching, lifting ADL's and in order to return to her part time job. 5. Pt will have improved B shoulder strength to at least 5-/5 and scap strength to 4/5 in order to stabilize her shoulders and spine for ADL's of lifting and reaching as well as for part time job duties. 6. Pt will be able to resume working at her part time job lifting up to 20# without increased sx. 7. Pt will be able [facility] or progress to [facility] as appropriate. Progression Towards Functional goals:  [x] Patient is progressing as expected towards functional goals listed. [] Progression is slowed due to complexities listed. [] Progression has been slowed due to co-morbidities. [] Plan just implemented, too soon to assess goals progression  [] Other:     ASSESSMENT:  Pt's objective measures have improved quite a bit since IE and she is experiencing less pain overall. However, she still has pain with lifting and OH activities with weight so have continued to work on improving shoulder ROM, scap and RC stability. Added in work with AT today for further strengthening.  Again, she had some soreness at the end of her session, so used e-stim and CP for pain modulation. She will benefit from continued PT to further improve scap and R shoulder strength, restore full shoulder ROM so that she may return to full work duties.        Treatment/Activity Tolerance:  [x] Patient tolerated treatment well [] Patient limited by fatique  [] Patient limited by pain  [] Patient limited by other medical complications  [] Other:     Prognosis: [x] Good [] Fair  [] Poor    Patient Requires Follow-up: [x] Yes  [] No    PLAN: Progress to lifting activities (5-10#) in order to prepare for returning to lifting activities at work on 3/19  [x] Continue per plan of care [] Alter current plan (see comments)  [] Plan of care initiated [] Hold pending MD visit [] Discharge    Electronically signed by: Francisco German, PT, DPT

## 2018-03-08 ENCOUNTER — HOSPITAL ENCOUNTER (OUTPATIENT)
Dept: PHYSICAL THERAPY | Age: 68
Discharge: HOME | End: 2018-03-09
Admitting: FAMILY MEDICINE

## 2018-03-12 ENCOUNTER — HOSPITAL ENCOUNTER (OUTPATIENT)
Dept: PHYSICAL THERAPY | Age: 68
Discharge: HOME | End: 2018-03-13
Admitting: FAMILY MEDICINE

## 2018-03-12 NOTE — FLOWSHEET NOTE
[x] CE(30647) x  2   [] IONTO  [] NMR (46572) x      [] VASO  [x] Manual (29512) x  1    [] Other:  [] TA x       [] Mech Traction (24292)  [] ES(attended) (03970)      [] ES (un) (65705):     GOALS: Patient stated goal: Pt would like to get back to work; get back to normal exercise; and learn PT exercises.     Therapist goals for Patient:   Short Term Goals: To be achieved in: 2 weeks  1. Independent in HEP and progression per patient tolerance, in order to prevent re-injury. met  2. Patient will have a decrease in pain to facilitate improvement in movement, function, and ADLs as indicated by Functional Deficits. met     Long Term Goals: To be achieved in: 6-8 weeks  1. Disability index score of 16% or less for the Quick DASH to assist with reaching prior level of function. progressing  2. Patient will demonstrate increased AROM to [employer] and without pain of cervical/thoracic spine to allow for proper joint functioning for normal driving, looking overhead. 3. Patient will demonstrate an increase in postural awareness and control and activation of  Deep cervical stabilizers to allow for proper functional mobility as indicated by patients Functional Deficits. met   4. Patient will have improved right shoulder ROM to [facility] without pain for all reaching, lifting ADL's and in order to return to her part time job. Nearly met  5. Pt will have improved B shoulder strength to at least 5-/5 and scap strength to 4/5 in order to stabilize her shoulders and spine for ADL's of lifting and reaching as well as for part time job duties. progressing  6. Pt will be able to resume working at her part time job lifting up to 20# without increased sx. 7. Pt will be able to return to an indep UE exercise/gym program or progress to [employer] as appropriate. Progression Towards Functional goals:  [x] Patient is progressing as expected towards functional goals listed. [] Progression is slowed due to complexities listed.   [] Progression has been slowed due to co-morbidities. [] Plan just implemented, too soon to assess goals progression  [] Other:     ASSESSMENT:  Pt's pain levels and shoulder function are much improved since beginning therapy. She now only has mild pain with functional sh ER. She does have reduced should strength, but again it is improving. 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 She will begin to do this next Monday. She is still having some sx of RC tendonitis and did have a flare up in pain when she tried to test her lifting abilities at work last week. She would benefit from continued PT on a 1-2x/week basis over the next month to continue to build shoulder strength, reduce sx of RC tendonitis in order to return to full work duties without pain.      Treatment/Activity Tolerance:  [x] Patient tolerated treatment well [] Patient limited by fatique  [] Patient limited by pain  [] Patient limited by other medical complications  [] Other:     Prognosis: [x] Good [] Fair  [] Poor    Patient Requires Follow-up: [x] Yes  [] No    PLAN: Progress to lifting activities (5-10#) in order to prepare for returning to lifting activities at work on 3/19  [x] Continue per plan of care [] Alter current plan (see comments)  [] Plan of care initiated [] Hold pending MD visit [] Discharge    Electronically signed by: Carrie Miles, PT, DPT

## 2018-03-12 NOTE — PROGRESS NOTES
[facility], [address], [address] [place] [address]  Phone: [phone]  Fax [phone]     Physical Therapy Re-Certification Plan of Care    Dear  Dr. Janette Blank,    We had the pleasure of treating the following patient for physical therapy services at [address] [place] [address]. A summary of our findings can be found in the updated assessment below. This includes our plan of care. If you have any questions or concerns regarding these findings, please do not hesitate to contact me at the office phone number checked above. Thank you for the referral.     Physician Signature:________________________________Date:__________________  By signing above, therapists plan is approved by physician      Patient: Geremias Santana   : 1950   MRN: [id number]  Referring Physician:  Tessy Tucker       Evaluation Date: 3/12/2018      Medical Diagnosis Information:  ·   Diagnosis: M79.2 (ICD-10-CM) - Radicular pain in right arm                Treatment Diagnosis: M25.511 Pain in right shoulder; M79.621 Pain in right upper arm; M54.2 Cervicalgia; M54.12 Cervical radiculopathy; M25.60 stiffness unspecified; M75.41 Right shoulder impingement   Insurance information:  /[employer]    Date Range:18-3/12/18  Total visits:10    G-Codes: (if applicable) PT G-Codes  Functional Assessment Tool Used: Quick DASH  Score: 27  Functional Limitation: Carrying, moving and handling objects  Carrying, Moving and Handling Objects Current Status (): At least 20 percent but less than 40 percent impaired, limited or restricted  Carrying, Moving and Handling Objects Goal Status ():  At least 1 percent but less than 20 percent impaired, limited or restricted   Functional Index used:Quick DASH    SUBJECTIVE: Pt reports that she is feeling better than last visit, but still has pain with reaching into the cabinets sometimes as well as with reaching straight back; she still has difficulty lifting heavier objects, such as the bird seed bags at work. She is not back to working out yet. Despite these lingering deficits, she feels 85% better. Current Pain Scale: 0-1.5/10    Type: []Constant   [x]Intermitment  []Radiating []Localized  []other:     Functional Limitations: [x]Lifting/reaching []Grooming  [x]Carrying []ADL's  []Driving []Sports/Recreations   [x]Other:work- lifting 20-25# bags of seed      · OBJECTIVE: Full cerv flex, ext ROM 35 deg with 3/10 pain; full SB, rot ROM  · Shoulder flexion AROM 175, ABD AROM 175; functional ER to T3 with mild pain (1.5/10); functional IR to T7  · MMT flex 4+/5, ABD 4+/5, ER, IR 5-/5; elbow f/e 5/5       Joint mobility: mild hypo post GHJ mob   []Normal    [x]Hypo   []Hyper    Palpation: TTP biceps LH, SH, supraspinatus    Orthopedic Tests: -empty can test    OTHER:      ASSESSMENT: Pt's pain levels and shoulder function are much improved since beginning therapy. She now only has mild pain with functional sh ER. She does have reduced should strength, but again it is improving. 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 She will begin to do this next Monday. She is still having some sx of RC tendonitis and did have a flare up in pain when she tried to test her lifting abilities at work last week. She would benefit from continued PT on a 1-2x/week basis over the next month to continue to build shoulder strength, reduce sx of RC tendonitis in order to return to full work duties without pain.       Response to Treatment:   [x]Patient is responding well to treatment and improvement is noted with regards  to goals   []Patient should continue to improve in reasonable time if they continue HEP   []Patient has plateaued and is no longer responding to skilled PT intervention    []Patient is getting worse and would benefit from return to referring MD   []Patient unable to adhere to initial POC      Functional deficiencies which affect ADL's and Reduce overall functional level:     [x]decreased RC/scapular strength and neuromuscular control - Reduced overall  functional level with carrying /lifting   [x]decreased UE ROM/joint mobility- Reduced overall functional level with  carrying /lifting    []pain/difficulty with driving and/or computer work- Reduced overall functional  level    []pain at end of day with ADL tasks- Reduced overall functional level   [x]pain/difficulty with lifting/reaching/carrying - Reduced overall functional level  with carrying and lifting   [x]unable to perform sport/recreational activity due to pain and dysfunction   [x]other:  Unable to perform all work tasks with lifting     Prognosis/Rehab Potential:    []Excellent   [x]Good    []Fair   []Poor: Toleration of evaluation or treatment:    []Excellent   [x]Good    []Fair   []Poor     New or Updated Goals (if applicable):  [x] No change to goals established upon initial eval/last progress note:  New Goals:    GOALS: GOALS: Patient stated goal: Pt would like to get back to work; get back to normal exercise; and learn PT exercises.     Therapist goals for Patient:   Short Term Goals: To be achieved in: 2 weeks  1. Independent in HEP and progression per patient tolerance, in order to prevent re-injury. met  2. Patient will have a decrease in pain to facilitate improvement in movement, function, and ADLs as indicated by Functional Deficits. met     Long Term Goals: To be achieved in: 6-8 weeks  1. Disability index score of 16% or less for the Quick DASH to assist with reaching prior level of function. progressing  2. Patient will demonstrate increased AROM to [facility] and without pain of cervical/thoracic spine to allow for proper joint functioning for normal driving, looking overhead.   3. Patient will demonstrate an increase in postural awareness and control and activation of  Deep cervical stabilizers to

## 2018-03-12 NOTE — FLOWSHEET NOTE
[facility],  [address] [place]  Phone: [phone]  Fax [phone]    ATHLETIC TRAINING [address]  Date:  3/12/2018    Patient Name:  Elizabeth Larry    :  1950  MRN: [id number]  Restrictions/Precautions:    Medical/Treatment Diagnosis Information:  ·  Radicular pain in R arm  ·  R shld pain, R upper arm pain, cervicalgia, cervical radiculopathy, R shld impingement, stiffness  Physician Information:   Dr. Whittington      Weeks Post-op  2wks    4 wks 6 wks 8 wks   3wks 6 wks 9 wks 12 wks                        Activity Log                                                          DOS/DOI:                                                         Date: 3/5/18 3/8/18 3/12/18    ATC Commun Getting sore by the end, mostly in upper arm Shld sore today, did too much at home yesterday          Plyoback      Chop                           Chest                           ER Flip            Long/Short lever  Flex. Scap.                                   ABd.             punch            Body/Cardio Blade Flex/Ext                                      IR/ER                                      ABd/ADd            Push-up      Plus                             Wall                           Table                          Floor            Ball on Wall 2# MB progressive wall climb OH 10x 2# MB progressive wall climb OH 10x w/PT                Tramp            Theraband    Rows/Ext                            IR                            ER                            No money                            Horiz.  ABd                            Biceps                            Triceps                            Punches            Cable Column   Rows 15# (B) 2x10 15# (B) 2x10 20# 2x10                               Ext.   15# 2x10                               Lat. Pulldown 25# (B) 2x10 25#

## 2018-03-15 ENCOUNTER — HOSPITAL ENCOUNTER (OUTPATIENT)
Dept: PHYSICAL THERAPY | Age: 68
Discharge: HOME | End: 2018-03-16
Admitting: FAMILY MEDICINE

## 2018-03-15 NOTE — FLOWSHEET NOTE
per patient tolerance, in order to prevent re-injury. met  2. Patient will have a decrease in pain to facilitate improvement in movement, function, and ADLs as indicated by Functional Deficits. met     Long Term Goals: To be achieved in: 6-8 weeks  1. Disability index score of 16% or less for the Quick DASH to assist with reaching prior level of function. progressing  2. Patient will demonstrate increased AROM to [facility] and without pain of cervical/thoracic spine to allow for proper joint functioning for normal driving, looking overhead. 3. Patient will demonstrate an increase in postural awareness and control and activation of  Deep cervical stabilizers to allow for proper functional mobility as indicated by patients Functional Deficits. met   4. Patient will have improved right shoulder ROM to [facility] without pain for all reaching, lifting ADL's and in order to return to her part time job. Nearly met  5. Pt will have improved B shoulder strength to at least 5-/5 and scap strength to 4/5 in order to stabilize her shoulders and spine for ADL's of lifting and reaching as well as for part time job duties. progressing  6. Pt will be able to resume working at her part time job lifting up to 20# without increased sx. 7. Pt will be able to [facility] program or progress to [facility] as appropriate. Progression Towards Functional goals:  [x] Patient is progressing as expected towards functional goals listed. [] Progression is slowed due to complexities listed. [] Progression has been slowed due to co-morbidities. [] Plan just implemented, too soon to assess goals progression  [] Other:     ASSESSMENT:  Pt's has been able to begin a bit more lifting at work with mild increases in pain. She would benefit from continued PT on a 1-2x/week basis over the next month to continue to build shoulder strength, reduce sx of RC tendonitis in order to return to full work duties without pain. Treatment/Activity Tolerance:  [x] Patient tolerated treatment well [] Patient limited by fatique  [] Patient limited by pain  [] Patient limited by other medical complications  [] Other:     Prognosis: [x] Good [] Fair  [] Poor    Patient Requires Follow-up: [x] Yes  [] No    PLAN: Continue to work on [address] for returning to lifting activities at work on 3/19  [x] Continue per plan of care [] Alter current plan (see comments)  [] Plan of care initiated [] Hold pending MD visit [] Discharge    Electronically signed by: Marci Pepe, PT, DPT

## 2018-03-15 NOTE — FLOWSHEET NOTE
[facility],  [address] [place]  Phone: [phone]  Fax [phone]    ATHLETIC TRAINING [address]  Date:  3/15/2018    Patient Name:  Ольга Hampton    :  1950  MRN: [id number]  Restrictions/Precautions:    Medical/Treatment Diagnosis Information:  ·  Radicular pain in R arm  ·  R shld pain, R upper arm pain, cervicalgia, cervical radiculopathy, R shld impingement, stiffness  Physician Information:   Dr. 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 Went over lift technique. Fatigue/mild soreness in R arm/shld but neck ok. Plyoback      Chop                           Chest                           ER Flip            Long/Short lever  Flex. Scap.                                   ABd.             punch            Body/Cardio Blade Flex/Ext                                      IR/ER                                      ABd/ADd            Push-up      Plus                             Wall                           Table                          Floor         R/L kneel on Airex, 2# MB chops R/L 10x ea   Ball on Wall 2# MB progressive wall climb OH 10x w/PT                 Tramp         4# MB squat and reach to shelf under plinth to place on stool on top of plinth 10x   Theraband    Rows/Ext                            IR                            ER                            No money                            Horiz.  ABd                            Biceps                            Triceps                            Punches Cable Column   Rows 15# (B) 2x10 20# 2x10  20# (B) 2x10  Upright row 10# (B) 2x10                              Ext.  15# 2x10                                Lat. Pulldown 25# (B) 2x10 30# 2x10  30# (B) 2x10                              Biceps                                 Triceps 15# (B) 2x10 15# 2x10           Modality IFC/CP 15'  CP 15'   Initials JLW SA  JLW   Time spent with PT assistant

## 2018-03-20 ENCOUNTER — HOSPITAL ENCOUNTER (OUTPATIENT)
Dept: PHYSICAL THERAPY | Age: 68
Discharge: HOME | End: 2018-03-21
Admitting: FAMILY MEDICINE

## 2018-03-20 NOTE — FLOWSHEET NOTE
[facility],  [address]  Phone: [phone]  Fax [phone]      Physical Therapy Daily Treatment Note  Date:  3/20/2018    Patient Name:  Lia Mcfarland    :  1950  MRN: [id number]  Restrictions/Precautions:    Medical/Treatment Diagnosis Information:  · Diagnosis: M79.2 (ICD-10-CM) - Radicular pain in right arm  · Treatment Diagnosis: M25.511 Pain in right shoulder; M79.621 Pain in right upper arm; M54.2 Cervicalgia; M54.12 Cervical radiculopathy; M25.60 stiffness unspecified; M75.41 Right shoulder impingement  Insurance/Certification information:  PT Insurance Information: /Med mutual  Physician Information:  Referring Practitioner: Tamika Bedoya MD  Plan of care signed (Y/N):     Date of Patient follow up with Physician:     G-Code (if applicable):      Date G-Code Applied:  18  PT G-Codes  Functional Assessment Tool Used: Quick DASH  Score: 27  Functional Limitation: Carrying, moving and handling objects  Carrying, Moving and Handling Objects Current Status (): At least 20 percent but less than 40 percent impaired, limited or restricted  Carrying, Moving and Handling Objects Goal Status (): At least 1 percent but less than 20 percent impaired, limited or restricted    Progress Note: [x]  Yes  []  No  Next due by: Visit #10      Latex Allergy:  [x]NO      []YES  Preferred Language for Healthcare:   [x]English       []other:    Visit # Insurance Allowable Requires auth   12 BMN    []no        []yes:     Pain level:  2-3 in R upper trap, posterior scap     SUBJECTIVE: Pt had her first day back at work yesterday. She had no inc'd pain during lifting but definitely after lifting later in the day. She was able to alleviate pain a bit with NSAID and ice, but pain is still present today.   Today entering PT, she feels some pain radiating down anterior arm to elbow and in her upper trap and mobs 4'    PA mobs gr III-IV approx T2-T6     Prone 2nd rib mob gr III  Supine 1st rib mob gr III 15\"x5  15\"x5    Instructed pt in self rib mobilization with mvmt with strap x10 Sent video for HEP   NMR re-education     Rhythmic stabilization 90 deg                                            Therapeutic Exercise and NMR EXR  [x] (20486) Provided verbal/tactile cueing for activities related to strengthening, flexibility, endurance, ROM  for improvements in scapular, scapulothoracic and UE control with self care, reaching, carrying, lifting, house/yardwork, driving/computer work.    [] (38680) Provided verbal/tactile cueing for activities related to improving balance, coordination, kinesthetic sense, posture, motor skill, proprioception  to assist with  scapular, scapulothoracic and UE control with self care, reaching, carrying, lifting, house/yardwork, driving/computer work. Therapeutic Activities:    [] (02446 or 86697) Provided verbal/tactile cueing for activities related to improving balance, coordination, kinesthetic sense, posture, motor skill, proprioception and motor activation to allow for proper function of scapular, scapulothoracic and UE control with self care, carrying, lifting, driving/computer work.      Home Exercise Program:    [x] (94212) Reviewed/Progressed HEP activities related to strengthening, flexibility, endurance, ROM of scapular, scapulothoracic and UE control with self care, reaching, carrying, lifting, house/yardwork, driving/computer work  [] (05555) Reviewed/Progressed HEP activities related to improving balance, coordination, kinesthetic sense, posture, motor skill, proprioception of scapular, scapulothoracic and UE control with self care, reaching, carrying, lifting, house/yardwork, driving/computer work      Manual Treatments:  PROM / STM / Oscillations-Mobs:  G-I, II, III, IV (PA's, Inf., Post.)  [x] (81523) Provided manual therapy to mobilize soft tissue/joints of cervical/CT, scapular GHJ and UE for the purpose of modulating pain, promoting relaxation,  increasing ROM, reducing/eliminating soft tissue swelling/inflammation/restriction, improving soft tissue extensibility and allowing for proper ROM for normal function with self care, reaching, carrying, lifting, house/yardwork, driving/computer work    Modalities:   CP scap  x 15'     Charges:  Timed Code Treatment Minutes: 40   Total Treatment Minutes: 55 (CP)     [] EVAL (LOW) 11335 (typically 20 minutes face-to-face)  [] EVAL (MOD) 57500 (typically 30 minutes face-to-face)  [] EVAL (HIGH) 08152 (typically 45 minutes face-to-face)  [] RE-EVAL     [x] ZM(62274) x  1   [] IONTO  [] NMR (57523) x      [] VASO  [x] Manual (42254) x  2    [] Other:  [] TA x       [] Mech Traction (54249)  [] ES(attended) (60648)      [] ES (un) (75941):     GOALS: Patient stated goal: Pt would like to get back to work; get back to normal exercise; and learn PT exercises.     Therapist goals for Patient:   Short Term Goals: To be achieved in: 2 weeks  1. Independent in HEP and progression per patient tolerance, in order to prevent re-injury. met  2. Patient will have a decrease in pain to facilitate improvement in movement, function, and ADLs as indicated by Functional Deficits. met     Long Term Goals: To be achieved in: 6-8 weeks  1. Disability index score of 16% or less for the Quick DASH to assist with reaching prior level of function. progressing  2. Patient will demonstrate increased AROM to [facility] and without pain of cervical/thoracic spine to allow for proper joint functioning for normal driving, looking overhead. 3. Patient will demonstrate an increase in postural awareness and control and activation of  Deep cervical stabilizers to allow for proper functional mobility as indicated by patients Functional Deficits. met   4.  Patient will have improved right shoulder ROM to [facility] without pain for all reaching, lifting ADL's and in order to return to her Alter current plan (see comments)  [] Plan of care initiated [] Hold pending MD visit [] Discharge    Electronically signed by: Annabella Khan PT, DPT

## 2018-03-22 ENCOUNTER — HOSPITAL ENCOUNTER (OUTPATIENT)
Dept: PHYSICAL THERAPY | Age: 68
Discharge: HOME | End: 2018-03-23
Admitting: FAMILY MEDICINE

## 2018-03-22 NOTE — FLOWSHEET NOTE
Ex Sets/rep comments   Pt education: lifting mechanics: bird seed from floor to lap in 1/2 kneeling to cart; holding bag close to body when transferring; discussed considering placing more of the weight of the seed through her L arm as she lifts it onto the shelf and using her R arm to slide it on the shelf; also discussed using flat lightweight cookie sheet to help with the \"uneven\" shape of the bag so she's pushing and lifting a solid more stable shape 5'    Cervical retraction seated following manual tx Pain abolished in scap, arm   cerv retraction supine Pain reduced in scap and arm   cerv retraction + ext Pain abolished in scap and arm   TT row, pull down   Contin + HEP red   Pulleys flex, scaption     Shoulder isometrics flex, ABD, ext, ER, IR  + HEP   SA punches 2x10 HEP 2#   S/l ER x30  HEP2#   Prone row, sh ext, T  Review NV+ HEP   Flexion AAROM with cane  + HEP   Bench press with cane  2#   ER wall walks  NV yellow   Supine ER with flex  Wrists hurts/ NV if able yellow   S/L abd  2x10 2#   Eccentric scaption, abduction at wall    Eccentric bicep [id number]- back down to red NV   Supine flexion (mid-range)  Supine horiz Add, abd 2x10   2#   3# progressive med ball climb     pec stretch on 1/2 FR  scap depression with deep breathing     \"finger-tips reach for feet\" in supination, neutral and prontation on FR 3'      Manual Intervention     STM RC tendons, R UT's, R lev scap, post scalene, thoracic PSs 12'    GHJ distraction, oscillation (better with long axis d/t getting some wrist distraction as well) GIII 4x30\"    GHJ post mobilization in IR GIII  Inf GHJ mob gr III 5x15\"  3\"x15\"    scap mobs     PA mobs gr III-IV approx T2-T6     Prone 2nd rib mob gr III  Supine 1st rib mob gr III  Not needed   Instructed pt in self rib mobilization with mvmt with strap x10 Sent video for HEP   NMR re-education     Rhythmic stabilization 90 deg                                            Therapeutic Exercise and NMR work    Modalities:   CP + IFC scap  x 15'     Charges:  Timed Code Treatment Minutes: 35   Total Treatment Minutes: 50 (IFC)     [] EVAL (LOW) 32828 (typically 20 minutes face-to-face)  [] EVAL (MOD) 25725 (typically 30 minutes face-to-face)  [] EVAL (HIGH) 62911 (typically 45 minutes face-to-face)  [] RE-EVAL     [x] RF(90219) x      [] IONTO  [] NMR (49812) x      [] VASO  [x] Manual (38507) x  2    [] Other:  [] TA x       [] Mech Traction (58293)  [] ES(attended) (89777)      [x] ES (un) (96159):     GOALS: Patient stated goal: Pt would like to get back to work; get back to normal exercise; and learn PT exercises.     Therapist goals for Patient:   Short Term Goals: To be achieved in: 2 weeks  1. Independent in HEP and progression per patient tolerance, in order to prevent re-injury. met  2. Patient will have a decrease in pain to facilitate improvement in movement, function, and ADLs as indicated by Functional Deficits. met     Long Term Goals: To be achieved in: 6-8 weeks  1. Disability index score of 16% or less for the Quick DASH to assist with reaching prior level of function. progressing  2. Patient will demonstrate increased [facility]OM to [facility] and without pain of cervical/thoracic spine to allow for proper joint functioning for normal driving, looking overhead. 3. Patient will demonstrate an increase in postural awareness and control and activation of  Deep cervical stabilizers to allow for proper functional mobility as indicated by patients Functional Deficits. met   4. Patient will have improved right shoulder ROM to [facility] without pain for all reaching, lifting ADL's and in order to return to her part time job. Nearly met  5. Pt will have improved B shoulder strength to at least 5-/5 and scap strength to 4/5 in order to stabilize her shoulders and spine for ADL's of lifting and reaching as well as for part time job duties. progressing  6.  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

## 2018-03-29 ENCOUNTER — HOSPITAL ENCOUNTER (OUTPATIENT)
Dept: PHYSICAL THERAPY | Age: 68
Discharge: HOME | End: 2018-03-30
Admitting: FAMILY MEDICINE

## 2018-03-29 NOTE — FLOWSHEET NOTE
indep UE exercise/gym program or progress to [employer] as appropriate. Progression Towards Functional goals:  [x] Patient is progressing as expected towards functional goals listed. [] Progression is slowed due to complexities listed. [] Progression has been slowed due to co-morbidities. [] Plan just implemented, too soon to assess goals progression  [] Other:     ASSESSMENT:  Pt continues to have localized anterior shoulder pain. Did update HEP for scap stabilization as we are anticipating that pt is close to working indep on continued strengthening. Did educate pt on the [employer] program if she is interested in this to continue strengthening. Pt was fatigued with session, but pain levels remained low. Treatment/Activity Tolerance:  [x] Patient tolerated treatment well [] Patient limited by fatique  [] Patient limited by pain  [] Patient limited by other medical complications  [] Other:     Prognosis: [x] Good [] Fair  [] Poor    Patient Requires Follow-up: [x] Yes  [] No    PLAN:  Continue to work on [employer] for returning to lifting activities at work, add DNF strength in supine and standing in combination with UE mvmt to simulate work duties.   [x] Continue per plan of care [] Alter current plan (see comments)  [] Plan of care initiated [] Hold pending MD visit [] Discharge    Electronically signed by: Gabi Sandoval, PT, DPT

## 2018-04-01 ENCOUNTER — HOSPITAL ENCOUNTER (OUTPATIENT)
Dept: PHYSICAL THERAPY | Age: 68
Discharge: HOME | End: 2018-04-30
Attending: FAMILY MEDICINE | Admitting: FAMILY MEDICINE

## 2018-04-03 ENCOUNTER — HOSPITAL ENCOUNTER (OUTPATIENT)
Dept: PHYSICAL THERAPY | Age: 68
Discharge: HOME | End: 2018-04-04
Admitting: FAMILY MEDICINE

## 2018-04-11 ENCOUNTER — HOSPITAL ENCOUNTER (OUTPATIENT)
Dept: PHYSICAL THERAPY | Age: 68
Discharge: HOME | End: 2018-04-12
Admitting: FAMILY MEDICINE

## 2018-04-11 ENCOUNTER — TELEPHONE (OUTPATIENT)
Dept: FAMILY MEDICINE CLINIC | Age: 68
End: 2018-04-11

## 2018-04-11 NOTE — TELEPHONE ENCOUNTER
Please let patient know that we do not have the forms that she is calling about.  Have the physical therapist [phone] any information that we need to be signing

## 2018-05-01 ENCOUNTER — HOSPITAL ENCOUNTER (OUTPATIENT)
Dept: PHYSICAL THERAPY | Age: 68
Discharge: HOME | End: 2018-05-31
Attending: FAMILY MEDICINE | Admitting: FAMILY MEDICINE